# Patient Record
Sex: MALE | Race: WHITE | Employment: FULL TIME | URBAN - METROPOLITAN AREA
[De-identification: names, ages, dates, MRNs, and addresses within clinical notes are randomized per-mention and may not be internally consistent; named-entity substitution may affect disease eponyms.]

---

## 2024-01-10 SDOH — HEALTH STABILITY: PHYSICAL HEALTH: ON AVERAGE, HOW MANY MINUTES DO YOU ENGAGE IN EXERCISE AT THIS LEVEL?: 60 MIN

## 2024-01-10 SDOH — HEALTH STABILITY: PHYSICAL HEALTH: ON AVERAGE, HOW MANY DAYS PER WEEK DO YOU ENGAGE IN MODERATE TO STRENUOUS EXERCISE (LIKE A BRISK WALK)?: 3 DAYS

## 2024-01-12 ENCOUNTER — OFFICE VISIT (OUTPATIENT)
Dept: FAMILY MEDICINE CLINIC | Age: 49
End: 2024-01-12
Payer: COMMERCIAL

## 2024-01-12 ENCOUNTER — HOSPITAL ENCOUNTER (OUTPATIENT)
Age: 49
Setting detail: SPECIMEN
Discharge: HOME | End: 2024-01-12

## 2024-01-12 VITALS
DIASTOLIC BLOOD PRESSURE: 100 MMHG | BODY MASS INDEX: 35.91 KG/M2 | TEMPERATURE: 97.5 F | WEIGHT: 228.8 LBS | SYSTOLIC BLOOD PRESSURE: 138 MMHG | OXYGEN SATURATION: 95 % | HEIGHT: 67 IN | HEART RATE: 79 BPM

## 2024-01-12 DIAGNOSIS — R29.818 SUSPECTED SLEEP APNEA: ICD-10-CM

## 2024-01-12 DIAGNOSIS — Z11.59 NEED FOR HEPATITIS B SCREENING TEST: ICD-10-CM

## 2024-01-12 DIAGNOSIS — Z00.00 WELL ADULT EXAM: ICD-10-CM

## 2024-01-12 DIAGNOSIS — Z12.11 COLON CANCER SCREENING: ICD-10-CM

## 2024-01-12 DIAGNOSIS — Z87.448 HX OF HEMATURIA: ICD-10-CM

## 2024-01-12 DIAGNOSIS — K08.89 PAIN, DENTAL: ICD-10-CM

## 2024-01-12 DIAGNOSIS — I10 PRIMARY HYPERTENSION: ICD-10-CM

## 2024-01-12 DIAGNOSIS — Z76.89 ENCOUNTER TO ESTABLISH CARE: Primary | ICD-10-CM

## 2024-01-12 DIAGNOSIS — Z23 NEED FOR TDAP VACCINATION: ICD-10-CM

## 2024-01-12 LAB
ALBUMIN SERPL-MCNC: 4.4 G/DL (ref 3.5–5.2)
ALBUMIN/GLOB SERPL: 2 {RATIO} (ref 1–2.5)
ALP SERPL-CCNC: 102 U/L (ref 40–129)
ALT SERPL-CCNC: 35 U/L (ref 10–50)
ANION GAP SERPL CALCULATED.3IONS-SCNC: 10 MMOL/L (ref 9–16)
AST SERPL-CCNC: 26 U/L (ref 10–50)
BACTERIA URNS QL MICRO: ABNORMAL
BASOPHILS # BLD: 0.05 K/UL (ref 0–0.2)
BASOPHILS NFR BLD: 1 % (ref 0–2)
BILIRUB SERPL-MCNC: 0.6 MG/DL (ref 0–1.2)
BILIRUB UR QL STRIP: NEGATIVE
BUN SERPL-MCNC: 18 MG/DL (ref 6–20)
CALCIUM SERPL-MCNC: 9.2 MG/DL (ref 8.6–10.4)
CASTS #/AREA URNS LPF: ABNORMAL /LPF (ref 0–8)
CHLORIDE SERPL-SCNC: 103 MMOL/L (ref 98–107)
CHOLEST SERPL-MCNC: 197 MG/DL (ref 0–199)
CHOLESTEROL/HDL RATIO: 4
CLARITY UR: CLEAR
CO2 SERPL-SCNC: 25 MMOL/L (ref 20–31)
COLOR UR: YELLOW
CREAT SERPL-MCNC: 1 MG/DL (ref 0.7–1.2)
EOSINOPHIL # BLD: 0.13 K/UL (ref 0–0.44)
EOSINOPHILS RELATIVE PERCENT: 1 % (ref 1–4)
EPI CELLS #/AREA URNS HPF: ABNORMAL /HPF (ref 0–5)
ERYTHROCYTE [DISTWIDTH] IN BLOOD BY AUTOMATED COUNT: 12.8 % (ref 11.8–14.4)
EST. AVERAGE GLUCOSE BLD GHB EST-MCNC: 91 MG/DL
GFR SERPL CREATININE-BSD FRML MDRD: >60 ML/MIN/1.73M2
GLUCOSE SERPL-MCNC: 89 MG/DL (ref 74–99)
GLUCOSE UR STRIP-MCNC: NEGATIVE MG/DL
HBA1C MFR BLD: 4.8 % (ref 4–6)
HBV CORE IGM SERPL QL IA: NONREACTIVE
HBV SURFACE AB SERPL IA-ACNC: 21.2 MIU/ML
HBV SURFACE AG SERPL QL IA: NONREACTIVE
HCT VFR BLD AUTO: 47.9 % (ref 40.7–50.3)
HCV AB SERPL QL IA: NONREACTIVE
HDLC SERPL-MCNC: 47 MG/DL
HGB BLD-MCNC: 15.9 G/DL (ref 13–17)
HGB UR QL STRIP.AUTO: ABNORMAL
HIV 1+2 AB+HIV1 P24 AG SERPL QL IA: NONREACTIVE
IMM GRANULOCYTES # BLD AUTO: 0.03 K/UL (ref 0–0.3)
IMM GRANULOCYTES NFR BLD: 0 %
KETONES UR STRIP-MCNC: NEGATIVE MG/DL
LDLC SERPL CALC-MCNC: 130 MG/DL (ref 0–100)
LEUKOCYTE ESTERASE UR QL STRIP: NEGATIVE
LYMPHOCYTES NFR BLD: 1 K/UL (ref 1.1–3.7)
LYMPHOCYTES RELATIVE PERCENT: 11 % (ref 24–43)
MCH RBC QN AUTO: 27.7 PG (ref 25.2–33.5)
MCHC RBC AUTO-ENTMCNC: 33.2 G/DL (ref 28.4–34.8)
MCV RBC AUTO: 83.3 FL (ref 82.6–102.9)
MONOCYTES NFR BLD: 0.79 K/UL (ref 0.1–1.2)
MONOCYTES NFR BLD: 9 % (ref 3–12)
NEUTROPHILS NFR BLD: 78 % (ref 36–65)
NEUTS SEG NFR BLD: 7.15 K/UL (ref 1.5–8.1)
NITRITE UR QL STRIP: NEGATIVE
NRBC BLD-RTO: 0 PER 100 WBC
PH UR STRIP: 5.5 [PH] (ref 5–8)
PLATELET # BLD AUTO: 267 K/UL (ref 138–453)
PMV BLD AUTO: 10 FL (ref 8.1–13.5)
POTASSIUM SERPL-SCNC: 4.3 MMOL/L (ref 3.7–5.3)
PROT SERPL-MCNC: 7.3 G/DL (ref 6.6–8.7)
PROT UR STRIP-MCNC: ABNORMAL MG/DL
RBC # BLD AUTO: 5.75 M/UL (ref 4.21–5.77)
RBC #/AREA URNS HPF: ABNORMAL /HPF (ref 0–4)
SODIUM SERPL-SCNC: 138 MMOL/L (ref 136–145)
SP GR UR STRIP: 1.02 (ref 1–1.03)
TRIGL SERPL-MCNC: 102 MG/DL (ref 0–149)
TSH SERPL DL<=0.05 MIU/L-ACNC: 1.89 UIU/ML (ref 0.27–4.2)
UROBILINOGEN UR STRIP-ACNC: NORMAL EU/DL (ref 0–1)
VLDLC SERPL CALC-MCNC: 20 MG/DL
WBC #/AREA URNS HPF: ABNORMAL /HPF (ref 0–5)
WBC OTHER # BLD: 9.2 K/UL (ref 3.5–11.3)

## 2024-01-12 PROCEDURE — 90715 TDAP VACCINE 7 YRS/> IM: CPT

## 2024-01-12 PROCEDURE — G8484 FLU IMMUNIZE NO ADMIN: HCPCS

## 2024-01-12 PROCEDURE — 99386 PREV VISIT NEW AGE 40-64: CPT

## 2024-01-12 PROCEDURE — 90471 IMMUNIZATION ADMIN: CPT

## 2024-01-12 PROCEDURE — 3075F SYST BP GE 130 - 139MM HG: CPT

## 2024-01-12 PROCEDURE — 3080F DIAST BP >= 90 MM HG: CPT

## 2024-01-12 SDOH — ECONOMIC STABILITY: FOOD INSECURITY: WITHIN THE PAST 12 MONTHS, THE FOOD YOU BOUGHT JUST DIDN'T LAST AND YOU DIDN'T HAVE MONEY TO GET MORE.: NEVER TRUE

## 2024-01-12 SDOH — ECONOMIC STABILITY: HOUSING INSECURITY
IN THE LAST 12 MONTHS, WAS THERE A TIME WHEN YOU DID NOT HAVE A STEADY PLACE TO SLEEP OR SLEPT IN A SHELTER (INCLUDING NOW)?: NO

## 2024-01-12 SDOH — ECONOMIC STABILITY: FOOD INSECURITY: WITHIN THE PAST 12 MONTHS, YOU WORRIED THAT YOUR FOOD WOULD RUN OUT BEFORE YOU GOT MONEY TO BUY MORE.: NEVER TRUE

## 2024-01-12 SDOH — ECONOMIC STABILITY: INCOME INSECURITY: HOW HARD IS IT FOR YOU TO PAY FOR THE VERY BASICS LIKE FOOD, HOUSING, MEDICAL CARE, AND HEATING?: NOT HARD AT ALL

## 2024-01-12 ASSESSMENT — PATIENT HEALTH QUESTIONNAIRE - PHQ9
SUM OF ALL RESPONSES TO PHQ QUESTIONS 1-9: 0
SUM OF ALL RESPONSES TO PHQ9 QUESTIONS 1 & 2: 0
2. FEELING DOWN, DEPRESSED OR HOPELESS: 0
1. LITTLE INTEREST OR PLEASURE IN DOING THINGS: 0
SUM OF ALL RESPONSES TO PHQ QUESTIONS 1-9: 0

## 2024-01-12 NOTE — PROGRESS NOTES
Heraclio Lassiter (:  1975) is a 48 y.o. male,New patient, here for evaluation of the following chief complaint(s):  Establish Care          Subjective   SUBJECTIVE/OBJECTIVE:  Pt here today to establish care  BP elevated, monitors intermittently at home    PMH significant for HTN, asymptomatic hematuria    Pt does not follow w/ any specialists, has not seen primary care in 1.5 years    Pt reports coughing up dark/blood tinged sputum each morning  Denies SOB, no CP, no persistent cough  Admits to dry mouth upon waking, he has recently begun using a humidifier which is helping all sx  He states he does snore, he has episodes of waking up choking/gasping  Discussed possible NIRALI w/ previous PCP but declined sleep study at that time    Acute concerns r/t dental pain  He has noticed this for the last few weeks, worsening recently  Pain is intermittent, sharp in nature  He is noticing sensitivity to his lower tooth, is overdue for dental exam    Significant cardiac family hx  Pt had stress test and cardiac workup almost 2 years ago because of an abnormal EKG done by PCP  Per pt all tests were WNL    Willing to update screening labs and HM gaps        Review of Systems       Objective   Physical Exam  Vitals and nursing note reviewed.   Constitutional:       General: He is not in acute distress.     Appearance: Normal appearance. He is well-developed. He is obese. He is not ill-appearing, toxic-appearing or diaphoretic.   HENT:      Head: Normocephalic and atraumatic.      Right Ear: External ear normal.      Left Ear: External ear normal. A middle ear effusion is present.      Nose: Nose normal.      Mouth/Throat:      Dentition: Dental tenderness present. No gingival swelling, dental abscesses or gum lesions.   Eyes:      General: No scleral icterus.        Right eye: No discharge.         Left eye: No discharge.      Conjunctiva/sclera: Conjunctivae normal.      Pupils: Pupils are equal, round, and reactive  No (0)

## 2024-01-17 ENCOUNTER — TELEPHONE (OUTPATIENT)
Age: 49
End: 2024-01-17

## 2024-07-23 ENCOUNTER — OFFICE VISIT (OUTPATIENT)
Dept: FAMILY MEDICINE CLINIC | Age: 49
End: 2024-07-23
Payer: COMMERCIAL

## 2024-07-23 VITALS
OXYGEN SATURATION: 97 % | SYSTOLIC BLOOD PRESSURE: 152 MMHG | DIASTOLIC BLOOD PRESSURE: 102 MMHG | BODY MASS INDEX: 37.4 KG/M2 | TEMPERATURE: 98.3 F | WEIGHT: 237 LBS | HEART RATE: 68 BPM

## 2024-07-23 DIAGNOSIS — I10 PRIMARY HYPERTENSION: Primary | ICD-10-CM

## 2024-07-23 DIAGNOSIS — Z12.11 COLON CANCER SCREENING: ICD-10-CM

## 2024-07-23 DIAGNOSIS — L98.9 CHANGING SKIN LESION: ICD-10-CM

## 2024-07-23 PROCEDURE — 99214 OFFICE O/P EST MOD 30 MIN: CPT

## 2024-07-23 PROCEDURE — 3080F DIAST BP >= 90 MM HG: CPT

## 2024-07-23 PROCEDURE — 1036F TOBACCO NON-USER: CPT

## 2024-07-23 PROCEDURE — 3077F SYST BP >= 140 MM HG: CPT

## 2024-07-23 PROCEDURE — G8417 CALC BMI ABV UP PARAM F/U: HCPCS

## 2024-07-23 PROCEDURE — G8427 DOCREV CUR MEDS BY ELIG CLIN: HCPCS

## 2024-07-23 RX ORDER — LISINOPRIL 10 MG/1
10 TABLET ORAL DAILY
Qty: 30 TABLET | Refills: 5 | Status: SHIPPED | OUTPATIENT
Start: 2024-07-23

## 2024-08-20 ENCOUNTER — OFFICE VISIT (OUTPATIENT)
Dept: FAMILY MEDICINE CLINIC | Age: 49
End: 2024-08-20
Payer: COMMERCIAL

## 2024-08-20 VITALS
HEART RATE: 74 BPM | TEMPERATURE: 97.9 F | SYSTOLIC BLOOD PRESSURE: 126 MMHG | OXYGEN SATURATION: 95 % | WEIGHT: 234 LBS | DIASTOLIC BLOOD PRESSURE: 88 MMHG | BODY MASS INDEX: 36.73 KG/M2 | HEIGHT: 67 IN

## 2024-08-20 DIAGNOSIS — I10 PRIMARY HYPERTENSION: ICD-10-CM

## 2024-08-20 PROCEDURE — G8427 DOCREV CUR MEDS BY ELIG CLIN: HCPCS

## 2024-08-20 PROCEDURE — 99214 OFFICE O/P EST MOD 30 MIN: CPT

## 2024-08-20 PROCEDURE — 1036F TOBACCO NON-USER: CPT

## 2024-08-20 PROCEDURE — G8417 CALC BMI ABV UP PARAM F/U: HCPCS

## 2024-08-20 PROCEDURE — 3079F DIAST BP 80-89 MM HG: CPT

## 2024-08-20 PROCEDURE — 3074F SYST BP LT 130 MM HG: CPT

## 2024-08-20 RX ORDER — LISINOPRIL 10 MG/1
10 TABLET ORAL DAILY
Qty: 90 TABLET | Refills: 1 | Status: SHIPPED | OUTPATIENT
Start: 2024-08-20

## 2024-08-20 NOTE — PROGRESS NOTES
Heraclio Lassiter (:  1975) is a 48 y.o. male,Established patient, here for evaluation of the following chief complaint(s):  Hypertension and Otalgia (Left ear pain)          Subjective   SUBJECTIVE/OBJECTIVE:  Pt here today for HTN f/u  VSS    BP greatly improved on ACE  He is tolerating this well  Notes improvements in previously noted brain fog            Review of Systems       Objective   Physical Exam  Vitals and nursing note reviewed.   Constitutional:       General: He is not in acute distress.     Appearance: Normal appearance. He is obese. He is not ill-appearing, toxic-appearing or diaphoretic.   Cardiovascular:      Rate and Rhythm: Normal rate and regular rhythm.      Pulses: Normal pulses.      Heart sounds: Normal heart sounds.   Pulmonary:      Effort: Pulmonary effort is normal.      Breath sounds: Normal breath sounds.   Neurological:      Mental Status: He is alert.              Assessment & Plan   ASSESSMENT/PLAN:  1. Primary hypertension  -improving, continue ACE  -pt to focus on lifestyle changes and weight loss    -     lisinopril (PRINIVIL;ZESTRIL) 10 MG tablet; Take 1 tablet by mouth daily, Disp-90 tablet, R-1Normal      Return in about 6 months (around 2025), or if symptoms worsen or fail to improve, for NTP w/ anyone, pt to call and decide.               An electronic signature was used to authenticate this note.    --JEWEL Gorman - CNP

## 2024-08-21 DIAGNOSIS — Z91.030 BEE STING ALLERGY: Primary | ICD-10-CM

## 2024-08-21 RX ORDER — EPINEPHRINE 0.3 MG/.3ML
0.3 INJECTION SUBCUTANEOUS
Qty: 1 EACH | Refills: 3 | Status: SHIPPED | OUTPATIENT
Start: 2024-08-21 | End: 2024-08-21